# Patient Record
Sex: MALE | URBAN - METROPOLITAN AREA
[De-identification: names, ages, dates, MRNs, and addresses within clinical notes are randomized per-mention and may not be internally consistent; named-entity substitution may affect disease eponyms.]

---

## 2023-12-05 ENCOUNTER — TRANSCRIBE ORDERS (OUTPATIENT)
Dept: ADMINISTRATIVE | Facility: HOSPITAL | Age: 43
End: 2023-12-05
Payer: MEDICARE

## 2023-12-05 DIAGNOSIS — R41.3 OTHER AMNESIA: Primary | ICD-10-CM

## 2023-12-14 ENCOUNTER — TELEPHONE (OUTPATIENT)
Dept: INFUSION THERAPY | Facility: HOSPITAL | Age: 43
End: 2023-12-14
Payer: MEDICARE

## 2023-12-14 PROBLEM — E11.9 DIABETES MELLITUS: Status: ACTIVE | Noted: 2023-12-14

## 2023-12-14 RX ORDER — LAMOTRIGINE 200 MG/1
200 TABLET, ORALLY DISINTEGRATING ORAL EVERY 12 HOURS SCHEDULED
COMMUNITY

## 2023-12-14 RX ORDER — CLONAZEPAM 1 MG/1
1 TABLET ORAL 3 TIMES DAILY PRN
COMMUNITY

## 2023-12-14 RX ORDER — FLUTICASONE PROPIONATE 50 MCG
2 SPRAY, SUSPENSION (ML) NASAL DAILY PRN
COMMUNITY

## 2023-12-14 RX ORDER — DICYCLOMINE HYDROCHLORIDE 10 MG/1
10 CAPSULE ORAL 2 TIMES DAILY
COMMUNITY

## 2023-12-14 RX ORDER — DULOXETIN HYDROCHLORIDE 30 MG/1
30 CAPSULE, DELAYED RELEASE ORAL DAILY
COMMUNITY

## 2023-12-14 RX ORDER — MULTIPLE VITAMINS W/ MINERALS TAB 9MG-400MCG
1 TAB ORAL DAILY
COMMUNITY

## 2023-12-14 RX ORDER — TRIAMCINOLONE ACETONIDE 1 MG/G
1 CREAM TOPICAL 2 TIMES DAILY
COMMUNITY
End: 2023-12-14

## 2023-12-14 RX ORDER — TRAZODONE HYDROCHLORIDE 100 MG/1
100 TABLET ORAL NIGHTLY
COMMUNITY

## 2023-12-14 RX ORDER — BACLOFEN 10 MG/1
10 TABLET ORAL 3 TIMES DAILY
COMMUNITY

## 2023-12-14 RX ORDER — OMEPRAZOLE 40 MG/1
40 CAPSULE, DELAYED RELEASE ORAL DAILY
COMMUNITY
End: 2023-12-14

## 2023-12-14 RX ORDER — CLOBETASOL PROPIONATE 0.5 MG/G
1 CREAM TOPICAL 2 TIMES DAILY
COMMUNITY
End: 2023-12-14

## 2023-12-14 RX ORDER — DICYCLOMINE HYDROCHLORIDE 10 MG/1
10 CAPSULE ORAL 3 TIMES DAILY PRN
COMMUNITY
End: 2023-12-14

## 2023-12-14 RX ORDER — OMEPRAZOLE 40 MG/1
40 CAPSULE, DELAYED RELEASE ORAL 2 TIMES DAILY
COMMUNITY

## 2023-12-14 RX ORDER — MONTELUKAST SODIUM 10 MG/1
10 TABLET ORAL DAILY
COMMUNITY

## 2023-12-14 RX ORDER — CETIRIZINE HYDROCHLORIDE 10 MG/1
10 TABLET ORAL DAILY
COMMUNITY

## 2023-12-14 NOTE — TELEPHONE ENCOUNTER
Pt contacted as pre-procedure phone call prior to planned LP for 12/18/23. Reviewed with patient arrival time, location, nothing to eat or drink by mouth, okay to take morning medications the day of procedure with a small sip of water,  needed, reviewed procedure instructions and allowed time for questions, and reviewed home medications, allergies, and medical history.

## 2023-12-18 ENCOUNTER — HOSPITAL ENCOUNTER (OUTPATIENT)
Dept: GENERAL RADIOLOGY | Facility: HOSPITAL | Age: 43
Discharge: HOME | End: 2023-12-18
Admitting: PSYCHIATRY & NEUROLOGY
Payer: MEDICARE

## 2023-12-18 VITALS
WEIGHT: 215 LBS | DIASTOLIC BLOOD PRESSURE: 94 MMHG | BODY MASS INDEX: 30.1 KG/M2 | HEIGHT: 71 IN | HEART RATE: 64 BPM | OXYGEN SATURATION: 97 % | RESPIRATION RATE: 14 BRPM | SYSTOLIC BLOOD PRESSURE: 164 MMHG | TEMPERATURE: 97.8 F

## 2023-12-18 DIAGNOSIS — R41.3 OTHER AMNESIA: ICD-10-CM

## 2023-12-18 LAB
APPEARANCE CSF: ABNORMAL
APPEARANCE CSF: CLEAR
C GATTII+NEOFOR DNA CSF QL NAA+NON-PROBE: NOT DETECTED
CMV DNA CSF QL NAA+PROBE: NOT DETECTED
COLOR CSF: ABNORMAL
COLOR CSF: COLORLESS
COLOR SPUN CSF: COLORLESS
COLOR SPUN CSF: COLORLESS
CRYPTOC AG CSF QL LA: NEGATIVE
E COLI K1 DNA CSF QL NAA+NON-PROBE: NOT DETECTED
EV RNA CSF QL NAA+PROBE: NOT DETECTED
GLUCOSE BLDC GLUCOMTR-MCNC: 76 MG/DL (ref 70–130)
GLUCOSE CSF-MCNC: 50 MG/DL (ref 40–70)
GLUCOSE CSF-MCNC: 52 MG/DL (ref 40–70)
GP B STREP DNA SPEC QL NAA+PROBE: NOT DETECTED
HAEM INFLU SEROTYP DNA SPEC NAA+PROBE: NOT DETECTED
HHV6 DNA CSF QL NAA+PROBE: NOT DETECTED
HSV1 DNA CSF QL NAA+PROBE: NOT DETECTED
HSV2 DNA CSF QL NAA+PROBE: NOT DETECTED
L MONOCYTOG RRNA SPEC QL PROBE: NOT DETECTED
N MEN DNA SPEC QL NAA+PROBE: NOT DETECTED
PARECHOVIRUS A RNA CSF QL NAA+NON-PROBE: NOT DETECTED
PROT CSF-MCNC: 33.2 MG/DL (ref 15–45)
PROT CSF-MCNC: 37.3 MG/DL (ref 15–45)
RBC # CSF MANUAL: 2000 /MM3 (ref 0–5)
RBC # CSF MANUAL: 228 /MM3 (ref 0–5)
S PNEUM DNA CSF QL NAA+NON-PROBE: NOT DETECTED
SPECIMEN VOL CSF: 8 ML
SPECIMEN VOL CSF: 8 ML
TUBE # CSF: 1
TUBE # CSF: 4
VZV DNA CSF QL NAA+PROBE: NOT DETECTED
WBC # CSF MANUAL: 0 /MM3 (ref 0–5)
WBC # CSF MANUAL: 2 /MM3 (ref 0–5)

## 2023-12-18 PROCEDURE — 83873 ASSAY OF CSF PROTEIN: CPT | Performed by: PSYCHIATRY & NEUROLOGY

## 2023-12-18 PROCEDURE — 87327 CRYPTOCOCCUS NEOFORM AG IA: CPT | Performed by: PSYCHIATRY & NEUROLOGY

## 2023-12-18 PROCEDURE — 82042 OTHER SOURCE ALBUMIN QUAN EA: CPT | Performed by: PSYCHIATRY & NEUROLOGY

## 2023-12-18 PROCEDURE — 89050 BODY FLUID CELL COUNT: CPT | Performed by: PSYCHIATRY & NEUROLOGY

## 2023-12-18 PROCEDURE — 82040 ASSAY OF SERUM ALBUMIN: CPT | Performed by: PSYCHIATRY & NEUROLOGY

## 2023-12-18 PROCEDURE — 82945 GLUCOSE OTHER FLUID: CPT | Performed by: PSYCHIATRY & NEUROLOGY

## 2023-12-18 PROCEDURE — 84157 ASSAY OF PROTEIN OTHER: CPT | Performed by: PSYCHIATRY & NEUROLOGY

## 2023-12-18 PROCEDURE — 83916 OLIGOCLONAL BANDS: CPT | Performed by: PSYCHIATRY & NEUROLOGY

## 2023-12-18 PROCEDURE — 87483 CNS DNA AMP PROBE TYPE 12-25: CPT | Performed by: PSYCHIATRY & NEUROLOGY

## 2023-12-18 PROCEDURE — 25010000002 LIDOCAINE 1 % SOLUTION: Performed by: STUDENT IN AN ORGANIZED HEALTH CARE EDUCATION/TRAINING PROGRAM

## 2023-12-18 PROCEDURE — 86592 SYPHILIS TEST NON-TREP QUAL: CPT | Performed by: PSYCHIATRY & NEUROLOGY

## 2023-12-18 PROCEDURE — 82948 REAGENT STRIP/BLOOD GLUCOSE: CPT

## 2023-12-18 PROCEDURE — 82784 ASSAY IGA/IGD/IGG/IGM EACH: CPT | Performed by: PSYCHIATRY & NEUROLOGY

## 2023-12-18 RX ORDER — LIDOCAINE HYDROCHLORIDE 10 MG/ML
5 INJECTION, SOLUTION INFILTRATION; PERINEURAL ONCE
Status: COMPLETED | OUTPATIENT
Start: 2023-12-18 | End: 2023-12-18

## 2023-12-18 RX ADMIN — LIDOCAINE HYDROCHLORIDE 5 ML: 10 INJECTION, SOLUTION INFILTRATION; PERINEURAL at 09:17

## 2023-12-18 NOTE — POST-PROCEDURE NOTE
Radiology Procedure    Pre-procedure: informed consent obtained from the patient's spouse     Procedure Performed: lumbar puncture     IV Sedation and/or Anesthesia:  No    Complications: none    Preliminary Findings: openign pressure 10cm H2O    Specimen Removed: 8cc blood tinged, clearing CSF    Estimated Blood Loss:  0ml    Post-Procedure Diagnosis: pending    Post-Procedure Plan: encourage fluids, bed rest x 2 hours    Standard Discharge Instructions Given:yes     VALENTE Knott  12/18/23  09:09 EST

## 2023-12-20 LAB
ALB CSF/SERPL: 7 {RATIO} (ref 0–8)
ALBUMIN CSF-MCNC: 26 MG/DL (ref 10–45)
ALBUMIN SERPL-MCNC: 4 G/DL (ref 4.1–5.1)
IGG CSF-MCNC: 1.7 MG/DL (ref 0–10.3)
IGG SERPL-MCNC: 562 MG/DL (ref 603–1613)
IGG SYNTH RATE SER+CSF CALC-MRATE: -1.7 MG/DAY
IGG/ALB CLEAR SER+CSF-RTO: 0.5 (ref 0–0.7)
IGG/ALB CSF: 0.07 {RATIO} (ref 0–0.25)
MBP CSF-MCNC: 6 NG/ML (ref 0–4.7)
OLIGOCLONAL BANDS.IT SER+CSF QL: ABNORMAL
REAGIN AB CSF QL: NON REACTIVE

## 2024-06-28 ENCOUNTER — TELEPHONE (OUTPATIENT)
Age: 44
End: 2024-06-28
Payer: MEDICARE

## 2024-06-28 DIAGNOSIS — M25.50 ARTHRALGIA, UNSPECIFIED JOINT: Primary | ICD-10-CM

## 2024-06-28 DIAGNOSIS — Z79.1 NSAID LONG-TERM USE: ICD-10-CM

## 2024-06-28 DIAGNOSIS — Z79.1 NSAID LONG-TERM USE: Primary | ICD-10-CM

## 2024-06-28 DIAGNOSIS — M25.50 ARTHRALGIA, UNSPECIFIED JOINT: ICD-10-CM

## 2024-09-04 PROBLEM — M25.50 JOINT PAIN: Status: ACTIVE | Noted: 2024-09-04

## 2024-09-06 ENCOUNTER — LAB (OUTPATIENT)
Facility: HOSPITAL | Age: 44
End: 2024-09-06
Payer: MEDICARE

## 2024-09-06 ENCOUNTER — OFFICE VISIT (OUTPATIENT)
Age: 44
End: 2024-09-06
Payer: MEDICARE

## 2024-09-06 VITALS
DIASTOLIC BLOOD PRESSURE: 94 MMHG | BODY MASS INDEX: 29.99 KG/M2 | SYSTOLIC BLOOD PRESSURE: 152 MMHG | HEIGHT: 71 IN | HEART RATE: 72 BPM | TEMPERATURE: 97.7 F

## 2024-09-06 DIAGNOSIS — Z79.1 NSAID LONG-TERM USE: ICD-10-CM

## 2024-09-06 DIAGNOSIS — M25.50 ARTHRALGIA, UNSPECIFIED JOINT: Primary | ICD-10-CM

## 2024-09-06 DIAGNOSIS — M25.50 ARTHRALGIA, UNSPECIFIED JOINT: ICD-10-CM

## 2024-09-06 LAB
ALBUMIN SERPL-MCNC: 4.5 G/DL (ref 3.5–5.2)
ALBUMIN/GLOB SERPL: 2.1 G/DL
ALP SERPL-CCNC: 104 U/L (ref 39–117)
ALT SERPL W P-5'-P-CCNC: 17 U/L (ref 1–41)
ANION GAP SERPL CALCULATED.3IONS-SCNC: 9.9 MMOL/L (ref 5–15)
AST SERPL-CCNC: 18 U/L (ref 1–40)
BASOPHILS # BLD AUTO: 0.03 10*3/MM3 (ref 0–0.2)
BASOPHILS NFR BLD AUTO: 0.6 % (ref 0–1.5)
BILIRUB SERPL-MCNC: 0.3 MG/DL (ref 0–1.2)
BUN SERPL-MCNC: 18 MG/DL (ref 6–20)
BUN/CREAT SERPL: 18.8 (ref 7–25)
CALCIUM SPEC-SCNC: 9.1 MG/DL (ref 8.6–10.5)
CHLORIDE SERPL-SCNC: 106 MMOL/L (ref 98–107)
CO2 SERPL-SCNC: 26.1 MMOL/L (ref 22–29)
CREAT SERPL-MCNC: 0.96 MG/DL (ref 0.76–1.27)
CRP SERPL-MCNC: <0.3 MG/DL (ref 0–0.5)
DEPRECATED RDW RBC AUTO: 45.1 FL (ref 37–54)
EGFRCR SERPLBLD CKD-EPI 2021: 98.7 ML/MIN/1.73
EOSINOPHIL # BLD AUTO: 0.08 10*3/MM3 (ref 0–0.4)
EOSINOPHIL NFR BLD AUTO: 1.6 % (ref 0.3–6.2)
ERYTHROCYTE [DISTWIDTH] IN BLOOD BY AUTOMATED COUNT: 14.2 % (ref 12.3–15.4)
ERYTHROCYTE [SEDIMENTATION RATE] IN BLOOD: <1 MM/HR (ref 0–15)
GLOBULIN UR ELPH-MCNC: 2.1 GM/DL
GLUCOSE SERPL-MCNC: 89 MG/DL (ref 65–99)
HCT VFR BLD AUTO: 45.9 % (ref 37.5–51)
HGB BLD-MCNC: 15.3 G/DL (ref 13–17.7)
IMM GRANULOCYTES # BLD AUTO: 0.02 10*3/MM3 (ref 0–0.05)
IMM GRANULOCYTES NFR BLD AUTO: 0.4 % (ref 0–0.5)
LYMPHOCYTES # BLD AUTO: 1.49 10*3/MM3 (ref 0.7–3.1)
LYMPHOCYTES NFR BLD AUTO: 29.5 % (ref 19.6–45.3)
MCH RBC QN AUTO: 28.9 PG (ref 26.6–33)
MCHC RBC AUTO-ENTMCNC: 33.3 G/DL (ref 31.5–35.7)
MCV RBC AUTO: 86.8 FL (ref 79–97)
MONOCYTES # BLD AUTO: 0.39 10*3/MM3 (ref 0.1–0.9)
MONOCYTES NFR BLD AUTO: 7.7 % (ref 5–12)
NEUTROPHILS NFR BLD AUTO: 3.04 10*3/MM3 (ref 1.7–7)
NEUTROPHILS NFR BLD AUTO: 60.2 % (ref 42.7–76)
NRBC BLD AUTO-RTO: 0 /100 WBC (ref 0–0.2)
PLATELET # BLD AUTO: 197 10*3/MM3 (ref 140–450)
PMV BLD AUTO: 10.8 FL (ref 6–12)
POTASSIUM SERPL-SCNC: 4.3 MMOL/L (ref 3.5–5.2)
PROT SERPL-MCNC: 6.6 G/DL (ref 6–8.5)
RBC # BLD AUTO: 5.29 10*6/MM3 (ref 4.14–5.8)
SODIUM SERPL-SCNC: 142 MMOL/L (ref 136–145)
WBC NRBC COR # BLD AUTO: 5.05 10*3/MM3 (ref 3.4–10.8)

## 2024-09-06 PROCEDURE — 85025 COMPLETE CBC W/AUTO DIFF WBC: CPT

## 2024-09-06 PROCEDURE — 83520 IMMUNOASSAY QUANT NOS NONAB: CPT

## 2024-09-06 PROCEDURE — 86431 RHEUMATOID FACTOR QUANT: CPT

## 2024-09-06 PROCEDURE — 86140 C-REACTIVE PROTEIN: CPT

## 2024-09-06 PROCEDURE — 85652 RBC SED RATE AUTOMATED: CPT

## 2024-09-06 PROCEDURE — 36415 COLL VENOUS BLD VENIPUNCTURE: CPT

## 2024-09-06 PROCEDURE — 86038 ANTINUCLEAR ANTIBODIES: CPT

## 2024-09-06 PROCEDURE — 86200 CCP ANTIBODY: CPT

## 2024-09-06 PROCEDURE — 80053 COMPREHEN METABOLIC PANEL: CPT

## 2024-09-06 RX ORDER — CLOBETASOL PROPIONATE 0.5 MG/G
CREAM TOPICAL
COMMUNITY
Start: 2024-06-13

## 2024-09-06 RX ORDER — BACLOFEN 10 MG/1
10 TABLET ORAL 3 TIMES DAILY
Qty: 270 TABLET | Refills: 1 | Status: SHIPPED | OUTPATIENT
Start: 2024-09-06

## 2024-09-06 RX ORDER — DIPHENOXYLATE HYDROCHLORIDE AND ATROPINE SULFATE 2.5; .025 MG/1; MG/1
1 TABLET ORAL DAILY
COMMUNITY

## 2024-09-06 RX ORDER — PROPRANOLOL HCL 20 MG
TABLET ORAL
COMMUNITY
Start: 2024-07-10

## 2024-09-06 RX ORDER — SULFASALAZINE 500 MG/1
1000 TABLET, DELAYED RELEASE ORAL 2 TIMES DAILY
Qty: 120 TABLET | Refills: 4 | Status: SHIPPED | OUTPATIENT
Start: 2024-09-06

## 2024-09-06 NOTE — PROGRESS NOTES
[facility] [place] [facility] Office Follow Up Visit     Office Follow Up      Date: 09/06/2024   Patient Name: Juan Antonio Olea  MRN: [id number]  YOB: 1980    Referring Physician: Provider, No Known     Chief Complaint   Patient presents with    Follow-up       History of Present Illness: Juan Antonio Olea is a 44 y.o. male who is here today for follow up on   History of Present Illness  The patient presents for evaluation of multiple medical concerns.    He reports that his back pain has significantly improved, but he continues to experience joint pain. He describes a constant ache in his joints, particularly in his knees and the middle joints of his fingers, which appear swollen. He also mentions a skin condition on his right knee, which is not psoriasis, and is managed with a cream prescribed by his dermatologist. He experiences morning stiffness lasting about an hour and rates his hand and knee pain as 2 out of 10. He occasionally experiences his knees locking up when bending over, accompanied by popping and cracking sounds. He uses compression gloves at bedtime, but not daily. He has been experiencing neck stiffness and is curious if this could be related to his other symptoms, as he sometimes hears cracking and popping sounds from his neck. His current medications include piroxicam, baclofen, and Osteo Bi-Flex taken twice daily. He previously tried hydroxychloroquine, but it did not provide relief.    He reports no history of liver problems.    He had a memory workup, and the findings indicated stress-related issues. He was getting lost in different cities, such as ending up in [place]. His condition improved unless his stress levels increased, which is currently happening. He went to [place] in 02/2024 and did not experience any problems. He had lab work done 2 months ago at [facility]. He started bruising easily and would get cuts from minor touches. The tests  did not reveal any abnormalities. He takes anti-inflammatories.    SOCIAL HISTORY  The patient denies smoking or vaping.    ALLERGIES  The patient has seasonal allergies.       Result Review :        Results  Laboratory Studies  Chemistry panel (CMP) shows normal kidney and liver functions. Hemoglobin was 13.7, hematocrit is within normal range.          Subjective     No Known Allergies      Current Outpatient Medications:     baclofen (LIORESAL) 10 MG tablet, Take 1 tablet by mouth 3 (Three) Times a Day., Disp: , Rfl:     Boswellia-Glucosamine-Vit D (OSTEO BI-FLEX ONE PER DAY PO), Take 1 tablet by mouth 2 (Two) Times a Day., Disp: , Rfl:     cetirizine (zyrTEC) 10 MG tablet, Take 1 tablet by mouth Daily., Disp: , Rfl:     clobetasol propionate (TEMOVATE) 0.05 % cream, , Disp: , Rfl:     clonazePAM (KlonoPIN) 1 MG tablet, Take 1 tablet by mouth 3 (Three) Times a Day As Needed., Disp: , Rfl:     Cobalamin Combinations (VITAMIN B12-FOLIC ACID PO), Place 1,000 mcg under the tongue Daily. SL 1000 mcg-400 mg, Disp: , Rfl:     dicyclomine (BENTYL) 10 MG capsule, Take 1 capsule by mouth 2 (Two) Times a Day., Disp: , Rfl:     DULoxetine (CYMBALTA) 30 MG capsule, Take 1 capsule by mouth Daily., Disp: , Rfl:     fluticasone (FLONASE) 50 MCG/ACT nasal spray, 2 sprays into the nostril(s) as directed by provider Daily As Needed for Allergies., Disp: , Rfl:     hydroxychloroquine (PLAQUENIL) 200 MG tablet, Take 1 tablet by mouth 2 (Two) Times a Day., Disp: , Rfl:     lamoTRIgine (LaMICtal) 200 MG tablet dispersible disintegrating tablet, Place 1 tablet on the tongue Every 12 (Twelve) Hours., Disp: , Rfl:     montelukast (SINGULAIR) 10 MG tablet, Take 1 tablet by mouth Daily., Disp: , Rfl:     Multi-Vitamin tablet tablet, Take 1 tablet by mouth Daily., Disp: , Rfl:     omeprazole (priLOSEC) 40 MG capsule, Take 1 capsule by mouth 2 (Two) Times a Day., Disp: , Rfl:     oxaprozin (DAYPRO) 600 MG tablet, Take 1 tablet by mouth Every  12 (Twelve) Hours., Disp: , Rfl:     propranolol (INDERAL) 20 MG tablet, , Disp: , Rfl:     Red Yeast Rice Extract (RED YEAST RICE PO), Take 600 mg by mouth Daily., Disp: , Rfl:     traZODone (DESYREL) 100 MG tablet, Take 1 tablet by mouth Every Night., Disp: , Rfl:     vitamin D3 125 MCG (5000 UT) capsule capsule, Take 1 capsule by mouth Daily., Disp: , Rfl:     acetaminophen (TYLENOL) 650 MG 8 hr tablet, Take 1 tablet by mouth Every 8 (Eight) Hours As Needed for Mild Pain. (Patient not taking: Reported on 9/6/2024), Disp: , Rfl:     colchicine 0.6 MG tablet, Take 1 tablet by mouth 2 (Two) Times a Day., Disp: , Rfl:     multivitamin with minerals (MULTIVITAMIN ADULT PO), Take 1 tablet by mouth Daily. (Patient not taking: Reported on 9/6/2024), Disp: , Rfl:     Past Medical History:   Diagnosis Date    Abdominal pain     AND BLOATING    Anal spasm     Anxiety     AR (allergic rhinitis)     Arthritis     Asthma     Back pain     Bipolar 1 disorder     Candidiasis     Chronic anal fissure     Chronic pain syndrome     CTS (carpal tunnel syndrome)     DDD (degenerative disc disease), lumbar     Degeneration of lumbosacral intervertebral disc     Depression     Diabetes     Epidermoid cyst     Epigastric pain     Gall stones     GERD (gastroesophageal reflux disease)     Hemorrhoids     Hyperlipidemia     Hypertension     IBS (irritable bowel syndrome)     Inflammatory dermatosis     Insomnia     Joint pain     Low testosterone     Lumbar spondylosis     Neck pain     Obesity     PTSD (post-traumatic stress disorder)     Sinusitis     Vitamin D deficiency         Past Surgical History:   Procedure Laterality Date    ABDOMINAL SURGERY      REMOVED UMBILICAL    ANAL FISTULA REPAIR      GASTRIC SLEEVE LAPAROSCOPIC  01/01/2018    PANNICULECTOMY      TONSILLECTOMY  1986       Family History   Problem Relation Age of Onset    Epilepsy Mother     Seizures Mother     Diabetes Mother     Obesity Mother     Arthritis Mother      COPD Father     Kidney disease Father     Hyperlipidemia Father     Asthma Father     Other (HYPERTENSIVE DISORDER) Father     Lung cancer Father 72    Arthritis Maternal Grandmother     Macular degeneration Maternal Grandmother     Glaucoma Maternal Grandmother     Diabetes Maternal Grandmother     Hypertension Maternal Grandmother     Arthritis Maternal Grandfather     Stroke Maternal Grandfather     Lung cancer Maternal Grandfather     Stroke Paternal Grandmother     Arthritis Paternal Grandmother     Arthritis Paternal Grandfather     Hypertension Paternal Grandfather         Social History     Socioeconomic History    Marital status:    Tobacco Use    Smoking status: Never    Smokeless tobacco: Never   Vaping Use    Vaping status: Never Used   Substance and Sexual Activity    Alcohol use: Not Currently    Drug use: Defer    Sexual activity: Defer       Review of Systems   Constitutional:  Positive for unexpected weight gain. Negative for fatigue and fever.   HENT:  Negative for mouth sores, nosebleeds, swollen glands and trouble swallowing.    Eyes:  Negative for blurred vision, double vision, photophobia, pain and visual disturbance.   Respiratory:  Negative for apnea, cough, choking and shortness of breath.    Cardiovascular:  Negative for chest pain, palpitations and leg swelling.   Gastrointestinal:  Positive for GERD. Negative for abdominal pain, blood in stool, constipation, diarrhea, nausea and vomiting.   Endocrine: Negative for cold intolerance, heat intolerance, polydipsia, polyphagia and polyuria.   Genitourinary:  Negative for difficulty urinating, dysuria, genital sores, hematuria and urinary incontinence.   Musculoskeletal:  Positive for arthralgias, joint swelling, neck pain and neck stiffness. Negative for back pain, gait problem, myalgias and bursitis.        Morning stiffness  Joint tenderness   Skin:  Negative for rash.   Allergic/Immunologic: Positive for environmental allergies.  "Negative for food allergies.   Neurological:  Negative for dizziness, tremors, seizures, syncope, weakness, numbness, headache, memory problem and confusion.   Hematological:  Negative for adenopathy. Bruises/bleeds easily.   Psychiatric/Behavioral:  Positive for depressed mood. Negative for sleep disturbance, suicidal ideas and stress. The patient is nervous/anxious.         Emotional labile      I have reviewed and updated the patient's chief complaint, history of present illness, review of systems, past medical history, surgical history, family history, social history, medications and allergy list as appropriate.     Objective    Vital Signs:   Vitals:    09/06/24 0824   BP: 152/94   BP Location: Left arm   Patient Position: Sitting   Cuff Size: Adult   Pulse: 72   Temp: 97.7 °F (36.5 °C)   Height: 180.3 cm (71\")   PainSc:   2   PainLoc: Generalized     Juan Antonio Olea reports a pain score of 2.  Given his pain assessment as noted, treatment options were discussed and the following options were decided upon as a follow-up plan to address the patient's pain: .      Body mass index is 29.99 kg/m².        Physical Exam   Physical Exam  Patient is alert, no acute distress.  Head is atraumatic, normocephalic. Pupils are equal and round with extraocular muscles intact. Oropharynx is negative. Tongue is mildly sticky today.  Lungs are clear.  Heart is regular without rubs, gallops, or murmurs.  Thoracic and lumbar areas are nontender. MCPs on the right are tender. There is a soft tissue nodule on the fifth PIP joint on the right. There are some Heberden's nodes present on the right hand. PIPs are tender on the left as well, with some Heberden's nodes. The PIPs look mildly swollen today. Lower extremity joints are nontender and negative for synovitis.  There are some erythematous patches on the skin of the right knee.    Physical Exam  There is currently no information documented on the homunculus. Go to the Rheumatology " activity and complete the [facility] joint exam.     Results Review:   Imaging Results (Last 24 Hours)       ** No results found for the last 24 hours. **            Procedures    Assessment / Plan    Assessment/Plan:   There are no diagnoses linked to this encounter.      Assessment & Plan  1. Joint pain.  He has PIP pain and swelling as well as MCP discomfort. In the past, he has also had symptoms in his toes and knees. He has tried Mobic, Celebrex, nabumetone, etodolac, Daypro, and sulindac. Currently, he is on piroxicam, which he prefers. X-rays have been negative for any erosions, but there is evidence of osteoarthritis in the hands, feet, and right hip. His previous work-up for inflammatory arthritis was negative in 2021. However, a repeat work-up is warranted today. He has not had any relief with the Medrol dosepak or Plaquenil trial. He has never tried sulfasalazine. A trial of sulfasalazine will be initiated, starting with one enteric-coated tablet twice a day for 3 weeks, then increasing to two tablets twice a day if tolerated. Side effects such as stomach upset, rash, and potential elevations in blood counts and liver functions were discussed. Labs including sed rate, CRP, LAXMI, rheumatoid factor III, CCP, and 14.3.3 eta will be ordered. He will continue using compression gloves and piroxicam.    2. Back pain.  He has had low back pain without radicular symptoms since 2012. He has seen orthopedics, pain management, physical therapy, and water therapy. He has tried gabapentin, anti-inflammatories, and injections. Initially, his morning stiffness lasted 2 to 3 hours but improved later in the day. He has not had psoriasis, inflammatory bowel disease, STD, or food poisoning. MRIs of the cervical and lumbar spine showed mild multilevel spondylosis. HLA-B27 was negative. Recently, he tried etodolac without relief. He also tried methocarbamol, which made him sleepy, so he is currently on baclofen, which helps  some. He has moderate to severe facet disease at L5-S1 on MRI. He is advised to continue Brandon flexion exercises (15-20 repetitions per day) and baclofen.    3. Rash.  The dry patches on the right knee since November 2022 have been diagnosed as lichen simplex chronicus by dermatology, not psoriasis. Treatment with topical steroids has been effective.    4. NSAID long-term use.  He is on piroxicam, and his August 2024 labs (CBC, CMP) look good. As a high-risk medication, sulfasalazine will be started. If he opts to stay on this, he will need a CBC, CMP, and urinalysis every 4 months.    Follow-up  The patient will follow up in 4 months.        Follow Up:   No follow-ups on file.       Sussy Lloyd MD  [facility] [place] [facility]     Encounter Administratively Closed by [employer]

## 2024-09-07 LAB — CCP IGA+IGG SERPL IA-ACNC: 5 UNITS (ref 0–19)

## 2024-09-10 LAB — ANA SER QL IF: NEGATIVE

## 2024-09-13 LAB
RF IGA SER-ACNC: <7 U
RF IGG SER-ACNC: <7 U
RF IGM SER IA-ACNC: <7 U

## 2024-09-15 LAB — 14-3-3 ETA AG SER IA-MCNC: <0.2 NG/ML

## 2025-04-16 ENCOUNTER — OFFICE VISIT (OUTPATIENT)
Age: 45
End: 2025-04-16
Payer: MEDICARE

## 2025-04-16 ENCOUNTER — LAB (OUTPATIENT)
Facility: HOSPITAL | Age: 45
End: 2025-04-16
Payer: MEDICARE

## 2025-04-16 VITALS
HEIGHT: 71 IN | TEMPERATURE: 98.3 F | SYSTOLIC BLOOD PRESSURE: 120 MMHG | DIASTOLIC BLOOD PRESSURE: 76 MMHG | BODY MASS INDEX: 31.3 KG/M2 | WEIGHT: 223.6 LBS | HEART RATE: 81 BPM

## 2025-04-16 DIAGNOSIS — M54.50 LUMBAR SPINE PAIN: ICD-10-CM

## 2025-04-16 DIAGNOSIS — M25.50 ARTHRALGIA, UNSPECIFIED JOINT: ICD-10-CM

## 2025-04-16 DIAGNOSIS — L28.0 LICHEN SIMPLEX: ICD-10-CM

## 2025-04-16 DIAGNOSIS — Z79.1 LONG TERM (CURRENT) USE OF NON-STEROIDAL ANTI-INFLAMMATORIES (NSAID): ICD-10-CM

## 2025-04-16 DIAGNOSIS — Z79.899 HIGH RISK MEDICATION USE: ICD-10-CM

## 2025-04-16 DIAGNOSIS — M25.50 ARTHRALGIA, UNSPECIFIED JOINT: Primary | ICD-10-CM

## 2025-04-16 LAB
ALBUMIN SERPL-MCNC: 4.1 G/DL (ref 3.5–5.2)
ALBUMIN/GLOB SERPL: 1.8 G/DL
ALP SERPL-CCNC: 106 U/L (ref 39–117)
ALT SERPL W P-5'-P-CCNC: 29 U/L (ref 1–41)
ANION GAP SERPL CALCULATED.3IONS-SCNC: 10.5 MMOL/L (ref 5–15)
AST SERPL-CCNC: 30 U/L (ref 1–40)
BASOPHILS # BLD AUTO: 0.01 10*3/MM3 (ref 0–0.2)
BASOPHILS NFR BLD AUTO: 0.2 % (ref 0–1.5)
BILIRUB SERPL-MCNC: 0.4 MG/DL (ref 0–1.2)
BUN SERPL-MCNC: 13 MG/DL (ref 6–20)
BUN/CREAT SERPL: 12.6 (ref 7–25)
CALCIUM SPEC-SCNC: 9.3 MG/DL (ref 8.6–10.5)
CHLORIDE SERPL-SCNC: 104 MMOL/L (ref 98–107)
CO2 SERPL-SCNC: 26.5 MMOL/L (ref 22–29)
CREAT SERPL-MCNC: 1.03 MG/DL (ref 0.76–1.27)
CRP SERPL-MCNC: <0.3 MG/DL (ref 0–0.5)
DEPRECATED RDW RBC AUTO: 43.9 FL (ref 37–54)
EGFRCR SERPLBLD CKD-EPI 2021: 90.7 ML/MIN/1.73
EOSINOPHIL # BLD AUTO: 0.07 10*3/MM3 (ref 0–0.4)
EOSINOPHIL NFR BLD AUTO: 1.3 % (ref 0.3–6.2)
ERYTHROCYTE [DISTWIDTH] IN BLOOD BY AUTOMATED COUNT: 13.1 % (ref 12.3–15.4)
ERYTHROCYTE [SEDIMENTATION RATE] IN BLOOD: <1 MM/HR (ref 0–15)
GLOBULIN UR ELPH-MCNC: 2.3 GM/DL
GLUCOSE SERPL-MCNC: 97 MG/DL (ref 65–99)
HCT VFR BLD AUTO: 47.2 % (ref 37.5–51)
HGB BLD-MCNC: 15.7 G/DL (ref 13–17.7)
IMM GRANULOCYTES # BLD AUTO: 0.01 10*3/MM3 (ref 0–0.05)
IMM GRANULOCYTES NFR BLD AUTO: 0.2 % (ref 0–0.5)
LYMPHOCYTES # BLD AUTO: 1.49 10*3/MM3 (ref 0.7–3.1)
LYMPHOCYTES NFR BLD AUTO: 27.8 % (ref 19.6–45.3)
MCH RBC QN AUTO: 30.2 PG (ref 26.6–33)
MCHC RBC AUTO-ENTMCNC: 33.3 G/DL (ref 31.5–35.7)
MCV RBC AUTO: 90.8 FL (ref 79–97)
MONOCYTES # BLD AUTO: 0.48 10*3/MM3 (ref 0.1–0.9)
MONOCYTES NFR BLD AUTO: 9 % (ref 5–12)
NEUTROPHILS NFR BLD AUTO: 3.3 10*3/MM3 (ref 1.7–7)
NEUTROPHILS NFR BLD AUTO: 61.5 % (ref 42.7–76)
NRBC BLD AUTO-RTO: 0 /100 WBC (ref 0–0.2)
PLATELET # BLD AUTO: 177 10*3/MM3 (ref 140–450)
PMV BLD AUTO: 10.2 FL (ref 6–12)
POTASSIUM SERPL-SCNC: 4 MMOL/L (ref 3.5–5.2)
PROT SERPL-MCNC: 6.4 G/DL (ref 6–8.5)
RBC # BLD AUTO: 5.2 10*6/MM3 (ref 4.14–5.8)
SODIUM SERPL-SCNC: 141 MMOL/L (ref 136–145)
WBC NRBC COR # BLD AUTO: 5.36 10*3/MM3 (ref 3.4–10.8)

## 2025-04-16 PROCEDURE — 36415 COLL VENOUS BLD VENIPUNCTURE: CPT

## 2025-04-16 PROCEDURE — 85025 COMPLETE CBC W/AUTO DIFF WBC: CPT

## 2025-04-16 PROCEDURE — 85652 RBC SED RATE AUTOMATED: CPT

## 2025-04-16 PROCEDURE — 86140 C-REACTIVE PROTEIN: CPT

## 2025-04-16 PROCEDURE — 80053 COMPREHEN METABOLIC PANEL: CPT

## 2025-04-16 RX ORDER — BACLOFEN 10 MG/1
10 TABLET ORAL 3 TIMES DAILY
Qty: 270 TABLET | Refills: 1 | Status: SHIPPED | OUTPATIENT
Start: 2025-04-16

## 2025-04-16 RX ORDER — ALBUTEROL SULFATE 90 UG/1
1 INHALANT RESPIRATORY (INHALATION) EVERY 6 HOURS PRN
COMMUNITY
Start: 2024-03-25

## 2025-04-16 RX ORDER — SULFASALAZINE 500 MG/1
1000 TABLET, DELAYED RELEASE ORAL 2 TIMES DAILY
Qty: 120 TABLET | Refills: 4 | Status: SHIPPED | OUTPATIENT
Start: 2025-04-16

## 2025-04-16 NOTE — ASSESSMENT & PLAN NOTE
Peroxicam    Risks of NSAIDS discussed including GI upset, GI bleeding, renal and hepatic risks and the risks of cardiovascular disease and stroke. Warned not to take with other NSAIDs including over-the-counter NSAIDs such as ibuprofen, naproxen, Aleve, Motrin, Advil.

## 2025-04-16 NOTE — PROGRESS NOTES
Office Visit       Date: 04/16/2025   Patient Name: Juan Antonio Olea  MRN: [id number]  YOB: 1980    Referring Physician: No ref. provider found     Chief Complaint:   Chief Complaint   Patient presents with    Follow-up       History of Present Illness: Juan Antonio Olea is a 44 y.o. male who is here today to follow-up on joint pain.  He is a former Dr. Sussy Lloyd patient.  When Dr. Lloyd saw him in September 2024 Jillian ordered inflammatory arthritis blood work which was found to be negative.  She also started him on sulfasalazine as a trial for inflammatory arthritis.  Today he reports feeling the same.  He rates his pain 2 out of 10, global 2 out of 10 and 1 hour morning stiffness.  He would like refills of his medications today.    PMH:  He reports that his back pain has significantly improved, but he continues to experience joint pain. He describes a constant ache in his joints, particularly in his knees and the middle joints of his fingers, which appear swollen. He also mentions a skin condition on his right knee, which is not psoriasis, and is managed with a cream prescribed by his dermatologist. He experiences morning stiffness lasting about an hour and rates his hand and knee pain as 2 out of 10. He occasionally experiences his knees locking up when bending over, accompanied by popping and cracking sounds. He uses compression gloves at bedtime, but not daily. He has been experiencing neck stiffness and is curious if this could be related to his other symptoms, as he sometimes hears cracking and popping sounds from his neck. His current medications include piroxicam, baclofen, and Osteo Bi-Flex taken twice daily. He previously tried hydroxychloroquine, but it did not provide relief.     He reports no history of liver problems.     He had a memory workup, and the findings indicated stress-related issues. He was getting lost in different cities, such as ending up in [place]. His  condition improved unless his stress levels increased, which is currently happening. He went to [place] in 02/2024 and did not experience any problems.    Subjective   Review of Systems:  Review of Systems   Constitutional:  Positive for fatigue.   Gastrointestinal:         GERD   Endocrine: Positive for cold intolerance.   Genitourinary:         Decreased libido   Musculoskeletal:  Positive for arthralgias, back pain, joint swelling and neck stiffness.   Allergic/Immunologic: Positive for environmental allergies.   Hematological:  Bruises/bleeds easily.   Psychiatric/Behavioral:  Positive for dysphoric mood and sleep disturbance. The patient is nervous/anxious.    All other systems reviewed and are negative.       Past Medical History:   Past Medical History:   Diagnosis Date    Abdominal pain     AND BLOATING    Anal spasm     Anxiety     AR (allergic rhinitis)     Arthritis     Asthma     Back pain     Bipolar 1 disorder     Candidiasis     Chronic anal fissure     Chronic pain syndrome     CTS (carpal tunnel syndrome)     DDD (degenerative disc disease), lumbar     Degeneration of lumbosacral intervertebral disc     Depression     Diabetes     Epidermoid cyst     Epigastric pain     Gall stones     GERD (gastroesophageal reflux disease)     Hemorrhoids     Hyperlipidemia     Hypertension     IBS (irritable bowel syndrome)     Inflammatory dermatosis     Insomnia     Joint pain     Low testosterone     Lumbar spondylosis     Neck pain     Obesity     PTSD (post-traumatic stress disorder)     Sinusitis     Vitamin D deficiency        Past Surgical History:   Past Surgical History:   Procedure Laterality Date    ABDOMINAL SURGERY      REMOVED UMBILICAL    ANAL FISTULA REPAIR      GASTRIC SLEEVE LAPAROSCOPIC  01/01/2018    PANNICULECTOMY      TONSILLECTOMY  1986       Family History:   Family History   Problem Relation Age of Onset    Epilepsy Mother     Seizures Mother     Diabetes Mother     Obesity Mother      Arthritis Mother     COPD Father     Kidney disease Father     Hyperlipidemia Father     Asthma Father     Other (HYPERTENSIVE DISORDER) Father     Lung cancer Father 72    Arthritis Maternal Grandmother     Macular degeneration Maternal Grandmother     Glaucoma Maternal Grandmother     Diabetes Maternal Grandmother     Hypertension Maternal Grandmother     Arthritis Maternal Grandfather     Stroke Maternal Grandfather     Lung cancer Maternal Grandfather     Stroke Paternal Grandmother     Arthritis Paternal Grandmother     Arthritis Paternal Grandfather     Hypertension Paternal Grandfather        Social History:   Social History     Socioeconomic History    Marital status:    Tobacco Use    Smoking status: Never    Smokeless tobacco: Never   Vaping Use    Vaping status: Never Used   Substance and Sexual Activity    Alcohol use: Not Currently    Drug use: Defer    Sexual activity: Defer       Medications:   Current Outpatient Medications:     acetaminophen (TYLENOL) 650 MG 8 hr tablet, Take 1 tablet by mouth Every 8 (Eight) Hours As Needed for Mild Pain., Disp: , Rfl:     albuterol sulfate  (90 Base) MCG/ACT inhaler, Inhale 1 puff Every 6 (Six) Hours As Needed., Disp: , Rfl:     baclofen (LIORESAL) 10 MG tablet, Take 1 tablet by mouth 3 (Three) Times a Day., Disp: 270 tablet, Rfl: 1    Boswellia-Glucosamine-Vit D (OSTEO BI-FLEX ONE PER DAY PO), Take 1 tablet by mouth 2 (Two) Times a Day., Disp: , Rfl:     cetirizine (zyrTEC) 10 MG tablet, Take 1 tablet by mouth Daily., Disp: , Rfl:     clobetasol propionate (TEMOVATE) 0.05 % cream, , Disp: , Rfl:     clonazePAM (KlonoPIN) 1 MG tablet, Take 1 tablet by mouth 3 (Three) Times a Day As Needed., Disp: , Rfl:     Cobalamin Combinations (VITAMIN B12-FOLIC ACID PO), Place 1,000 mcg under the tongue Daily. SL 1000 mcg-400 mg, Disp: , Rfl:     colchicine 0.6 MG tablet, Take 1 tablet by mouth 2 (Two) Times a Day., Disp: , Rfl:     dicyclomine (BENTYL) 10 MG  "capsule, Take 1 capsule by mouth 2 (Two) Times a Day., Disp: , Rfl:     DULoxetine (CYMBALTA) 30 MG capsule, Take 1 capsule by mouth Daily., Disp: , Rfl:     fluticasone (FLONASE) 50 MCG/ACT nasal spray, Administer 2 sprays into the nostril(s) as directed by provider Daily As Needed for Allergies., Disp: , Rfl:     lamoTRIgine (LaMICtal) 200 MG tablet dispersible disintegrating tablet, Place 1 tablet on the tongue Every 12 (Twelve) Hours., Disp: , Rfl:     Misc Natural Products (OSTEO BI-FLEX ADV DOUBLE ST PO), Take 1 dose by mouth 2 (Two) Times a Day., Disp: , Rfl:     montelukast (SINGULAIR) 10 MG tablet, Take 1 tablet by mouth Daily., Disp: , Rfl:     Multi-Vitamin tablet tablet, Take 1 tablet by mouth Daily., Disp: , Rfl:     multivitamin with minerals (MULTIVITAMIN ADULT PO), Take 1 tablet by mouth Daily., Disp: , Rfl:     omeprazole (priLOSEC) 40 MG capsule, Take 1 capsule by mouth 2 (Two) Times a Day., Disp: , Rfl:     piroxicam (FELDENE) 20 MG capsule, Take 1 capsule by mouth Daily., Disp: 90 capsule, Rfl: 1    propranolol (INDERAL) 20 MG tablet, , Disp: , Rfl:     Red Yeast Rice Extract (RED YEAST RICE PO), Take 600 mg by mouth Daily., Disp: , Rfl:     sulfaSALAzine (AZULFIDINE ENTABS) 500 MG EC tablet, Take 2 tablets by mouth 2 (Two) Times a Day., Disp: 120 tablet, Rfl: 4    traZODone (DESYREL) 100 MG tablet, Take 1 tablet by mouth Every Night., Disp: , Rfl:     vitamin D3 125 MCG (5000 UT) capsule capsule, Take 1 capsule by mouth Daily., Disp: , Rfl:     Allergies: No Known Allergies    I have reviewed and updated the patient's chief complaint, history of present illness, review of systems, past medical history, surgical history, family history, social history, medications and allergy list as appropriate.     Objective    Vital Signs:   Vitals:    04/16/25 0836   BP: 120/76   Pulse: 81   Temp: 98.3 °F (36.8 °C)   Weight: 101 kg (223 lb 9.6 oz)   Height: 180.3 cm (71\")   PainSc: 2      Body mass index is " 31.19 kg/m².          Physical Exam:  Physical Exam  Vitals reviewed.   Constitutional:       Appearance: Normal appearance.   HENT:      Head: Normocephalic and atraumatic.      Mouth/Throat:      Mouth: Mucous membranes are moist.   Eyes:      Conjunctiva/sclera: Conjunctivae normal.   Cardiovascular:      Rate and Rhythm: Normal rate and regular rhythm.      Pulses: Normal pulses.      Heart sounds: Normal heart sounds.   Pulmonary:      Effort: Pulmonary effort is normal.      Breath sounds: Normal breath sounds.   Musculoskeletal:         General: Normal range of motion.      Cervical back: Normal range of motion and neck supple.      Comments: No synovitis  Tender MCP and PIP to palpation  Tender right knee with palpation with ? Cyst that is erythematous and mobile.  Hammer toes bilaterally.  Wide spacing between first and second toes bilaterally.   Skin:     General: Skin is warm and dry.   Neurological:      General: No focal deficit present.      Mental Status: He is alert and oriented to person, place, and time. Mental status is at baseline.   Psychiatric:         Mood and Affect: Mood normal.         Behavior: Behavior normal.         Thought Content: Thought content normal.         Judgment: Judgment normal.          Results Review:   Imaging Results (Last 24 Hours)       ** No results found for the last 24 hours. **            Procedures    Assessment / Plan    Assessment/Plan:   Diagnoses and all orders for this visit:    1. Arthralgia, unspecified joint (Primary)  Assessment & Plan:  He has PIP pain and swelling as well as MCP discomfort. In the past, he has also had symptoms in his toes and knees. He has tried Mobic, Celebrex, nabumetone, etodolac, Daypro, and sulindac. Currently, he is on piroxicam, which he prefers.   X-rays have been negative for any erosions, but there is evidence of osteoarthritis in the hands, feet, and right hip.   9/2024: LAXMI and rheumatoid arthritis testing negative.   Inflammatory markers within normal range.    He has not had any relief with the Medrol dosepak or Plaquenil trial. At last visit, 9/2024, he was started on a sulfasalazine trial.  Side effects such as stomach upset, rash, and potential elevations in blood counts and liver functions were discussed.   He does think sulfasalazine has helped.  ? Seronegative RA.  He does have hand pain and swelling.  He has knee pain and right knee with swelling and ? Cyst.  Xray right knee today.  ? Cyst is erythematous, mobile and tender.  He has hammertoes bilaterally and pain in his toes.  He has wide spacing between 1st and 2nd toes bilaterally.    Orders:  -     Comprehensive Metabolic Panel; Future  -     C-reactive Protein; Future  -     Sedimentation Rate; Future  -     CBC & Differential; Future  -     XR Knee 1 or 2 View Right    2. Lumbar spine pain  Assessment & Plan:  He has had low back pain without radicular symptoms since 2012.   He has seen orthopedics, pain management, physical therapy, and water therapy.   He has tried gabapentin, anti-inflammatories, and injections.   Initially, his morning stiffness lasted 2 to 3 hours but improved later in the day.   He has not had psoriasis, inflammatory bowel disease, STD, or food poisoning.   MRIs of the cervical and lumbar spine showed mild multilevel spondylosis. HLA-B27 was negative. He tried etodolac without relief. He also tried methocarbamol, which made him sleepy, so he is currently on baclofen, which helps some.   He has moderate to severe facet disease at L5-S1 on MRI.   He is advised to continue Brandon flexion exercises (15-20 repetitions per day) and baclofen.   Back pain better with initiation of sulfasalazine.      3. High risk medication use  Assessment & Plan:  Sulfasalazine 1000mg po BID    Labs every 4 months        Orders:  -     Comprehensive Metabolic Panel; Future  -     C-reactive Protein; Future  -     Sedimentation Rate; Future  -     CBC &  Differential; Future  -     XR Knee 1 or 2 View Right    4. Lichen simplex  Assessment & Plan:  The dry patches on the right knee since November 2022 have been diagnosed as lichen simplex chronicus by dermatology, not psoriasis. Treatment with topical steroids has been effective.       5. Long term (current) use of non-steroidal anti-inflammatories (nsaid)  Assessment & Plan:  Peroxicam    Risks of NSAIDS discussed including GI upset, GI bleeding, renal and hepatic risks and the risks of cardiovascular disease and stroke. Warned not to take with other NSAIDs including over-the-counter NSAIDs such as ibuprofen, naproxen, Aleve, Motrin, Advil.     Orders:  -     Comprehensive Metabolic Panel; Future  -     C-reactive Protein; Future  -     Sedimentation Rate; Future  -     CBC & Differential; Future    Other orders  -     sulfaSALAzine (AZULFIDINE ENTABS) 500 MG EC tablet; Take 2 tablets by mouth 2 (Two) Times a Day.  Dispense: 120 tablet; Refill: 4  -     piroxicam (FELDENE) 20 MG capsule; Take 1 capsule by mouth Daily.  Dispense: 90 capsule; Refill: 1  -     baclofen (LIORESAL) 10 MG tablet; Take 1 tablet by mouth 3 (Three) Times a Day.  Dispense: 270 tablet; Refill: 1        Follow Up:   Return in about 4 months (around 8/16/2025) for NP.        KRISTY Ochoa   [facility]

## 2025-04-16 NOTE — ASSESSMENT & PLAN NOTE
He has had low back pain without radicular symptoms since 2012.   He has seen orthopedics, pain management, physical therapy, and water therapy.   He has tried gabapentin, anti-inflammatories, and injections.   Initially, his morning stiffness lasted 2 to 3 hours but improved later in the day.   He has not had psoriasis, inflammatory bowel disease, STD, or food poisoning.   MRIs of the cervical and lumbar spine showed mild multilevel spondylosis. HLA-B27 was negative. He tried etodolac without relief. He also tried methocarbamol, which made him sleepy, so he is currently on baclofen, which helps some.   He has moderate to severe facet disease at L5-S1 on MRI.   He is advised to continue Brandon flexion exercises (15-20 repetitions per day) and baclofen.   Back pain better with initiation of sulfasalazine.

## 2025-04-16 NOTE — ASSESSMENT & PLAN NOTE
The dry patches on the right knee since November 2022 have been diagnosed as lichen simplex chronicus by dermatology, not psoriasis. Treatment with topical steroids has been effective.

## 2025-04-22 DIAGNOSIS — M25.461 EFFUSION OF RIGHT KNEE: Primary | ICD-10-CM

## 2025-08-18 RX ORDER — SULFASALAZINE 500 MG/1
1000 TABLET, DELAYED RELEASE ORAL 2 TIMES DAILY
Qty: 120 TABLET | Refills: 4 | Status: SHIPPED | OUTPATIENT
Start: 2025-08-18 | End: 2025-08-20 | Stop reason: SDUPTHER

## 2025-08-20 ENCOUNTER — OFFICE VISIT (OUTPATIENT)
Age: 45
End: 2025-08-20
Payer: MEDICARE

## 2025-08-20 VITALS
SYSTOLIC BLOOD PRESSURE: 132 MMHG | BODY MASS INDEX: 31.69 KG/M2 | HEART RATE: 73 BPM | WEIGHT: 226.4 LBS | TEMPERATURE: 97.9 F | HEIGHT: 71 IN | DIASTOLIC BLOOD PRESSURE: 82 MMHG

## 2025-08-20 DIAGNOSIS — L28.0 LICHEN SIMPLEX: ICD-10-CM

## 2025-08-20 DIAGNOSIS — M25.50 ARTHRALGIA, UNSPECIFIED JOINT: Primary | ICD-10-CM

## 2025-08-20 DIAGNOSIS — Z79.899 HIGH RISK MEDICATION USE: ICD-10-CM

## 2025-08-20 DIAGNOSIS — Z79.1 LONG TERM (CURRENT) USE OF NON-STEROIDAL ANTI-INFLAMMATORIES (NSAID): ICD-10-CM

## 2025-08-20 DIAGNOSIS — M54.50 LUMBAR SPINE PAIN: ICD-10-CM

## 2025-08-20 RX ORDER — SULFASALAZINE 500 MG/1
1000 TABLET, DELAYED RELEASE ORAL 2 TIMES DAILY
Qty: 120 TABLET | Refills: 4 | Status: SHIPPED | OUTPATIENT
Start: 2025-08-20

## 2025-08-20 RX ORDER — BACLOFEN 10 MG/1
10 TABLET ORAL 3 TIMES DAILY
Qty: 270 TABLET | Refills: 1 | Status: SHIPPED | OUTPATIENT
Start: 2025-08-20

## 2025-08-20 RX ORDER — DOXYCYCLINE 100 MG/1
CAPSULE ORAL
COMMUNITY
Start: 2025-01-10

## 2025-08-20 RX ORDER — CELECOXIB 100 MG/1
100 CAPSULE ORAL 2 TIMES DAILY PRN
Qty: 60 CAPSULE | Refills: 3 | Status: SHIPPED | OUTPATIENT
Start: 2025-08-20

## 2025-08-20 RX ORDER — PREDNISONE 10 MG/1
TABLET ORAL SEE ADMIN INSTRUCTIONS
COMMUNITY
Start: 2025-01-10 | End: 2025-08-20

## 2025-08-20 RX ORDER — METHYLPREDNISOLONE 4 MG/1
TABLET ORAL
COMMUNITY
Start: 2025-06-03 | End: 2025-08-20

## 2025-08-21 LAB
ALBUMIN SERPL-MCNC: 4.5 G/DL (ref 3.5–5.2)
ALBUMIN/GLOB SERPL: 2.8 G/DL
ALP SERPL-CCNC: 123 U/L (ref 39–117)
ALT SERPL-CCNC: 21 U/L (ref 1–41)
AST SERPL-CCNC: 25 U/L (ref 1–40)
BASOPHILS # BLD AUTO: 0.02 10*3/MM3 (ref 0–0.2)
BASOPHILS NFR BLD AUTO: 0.4 % (ref 0–1.5)
BILIRUB SERPL-MCNC: 0.3 MG/DL (ref 0–1.2)
BUN SERPL-MCNC: 12 MG/DL (ref 6–20)
BUN/CREAT SERPL: 11.5 (ref 7–25)
CALCIUM SERPL-MCNC: 9.3 MG/DL (ref 8.6–10.5)
CHLORIDE SERPL-SCNC: 104 MMOL/L (ref 98–107)
CO2 SERPL-SCNC: 25.8 MMOL/L (ref 22–29)
CREAT SERPL-MCNC: 1.04 MG/DL (ref 0.76–1.27)
CRP SERPL-MCNC: <0.3 MG/DL (ref 0–0.5)
EGFRCR SERPLBLD CKD-EPI 2021: 89.1 ML/MIN/1.73
EOSINOPHIL # BLD AUTO: 0.08 10*3/MM3 (ref 0–0.4)
EOSINOPHIL NFR BLD AUTO: 1.6 % (ref 0.3–6.2)
ERYTHROCYTE [DISTWIDTH] IN BLOOD BY AUTOMATED COUNT: 13.1 % (ref 12.3–15.4)
ERYTHROCYTE [SEDIMENTATION RATE] IN BLOOD BY WESTERGREN METHOD: <1 MM/HR (ref 0–15)
GLOBULIN SER CALC-MCNC: 1.6 GM/DL
GLUCOSE SERPL-MCNC: 77 MG/DL (ref 65–99)
HCT VFR BLD AUTO: 46.5 % (ref 37.5–51)
HGB BLD-MCNC: 15.2 G/DL (ref 13–17.7)
IMM GRANULOCYTES # BLD AUTO: 0.02 10*3/MM3 (ref 0–0.05)
IMM GRANULOCYTES NFR BLD AUTO: 0.4 % (ref 0–0.5)
LYMPHOCYTES # BLD AUTO: 1.56 10*3/MM3 (ref 0.7–3.1)
LYMPHOCYTES NFR BLD AUTO: 31.3 % (ref 19.6–45.3)
MCH RBC QN AUTO: 29.7 PG (ref 26.6–33)
MCHC RBC AUTO-ENTMCNC: 32.7 G/DL (ref 31.5–35.7)
MCV RBC AUTO: 91 FL (ref 79–97)
MONOCYTES # BLD AUTO: 0.41 10*3/MM3 (ref 0.1–0.9)
MONOCYTES NFR BLD AUTO: 8.2 % (ref 5–12)
NEUTROPHILS # BLD AUTO: 2.9 10*3/MM3 (ref 1.7–7)
NEUTROPHILS NFR BLD AUTO: 58.1 % (ref 42.7–76)
NRBC BLD AUTO-RTO: 0 /100 WBC (ref 0–0.2)
PLATELET # BLD AUTO: 201 10*3/MM3 (ref 140–450)
POTASSIUM SERPL-SCNC: 4.2 MMOL/L (ref 3.5–5.2)
PROT SERPL-MCNC: 6.1 G/DL (ref 6–8.5)
RBC # BLD AUTO: 5.11 10*6/MM3 (ref 4.14–5.8)
SODIUM SERPL-SCNC: 141 MMOL/L (ref 136–145)
WBC # BLD AUTO: 4.99 10*3/MM3 (ref 3.4–10.8)